# Patient Record
Sex: FEMALE | Race: WHITE | NOT HISPANIC OR LATINO
[De-identification: names, ages, dates, MRNs, and addresses within clinical notes are randomized per-mention and may not be internally consistent; named-entity substitution may affect disease eponyms.]

---

## 2023-04-20 PROBLEM — Z00.00 ENCOUNTER FOR PREVENTIVE HEALTH EXAMINATION: Status: ACTIVE | Noted: 2023-04-20

## 2023-04-21 ENCOUNTER — APPOINTMENT (OUTPATIENT)
Dept: ORTHOPEDIC SURGERY | Facility: CLINIC | Age: 78
End: 2023-04-21
Payer: MEDICARE

## 2023-04-21 DIAGNOSIS — Z87.898 PERSONAL HISTORY OF OTHER SPECIFIED CONDITIONS: ICD-10-CM

## 2023-04-21 DIAGNOSIS — Z90.2 ACQUIRED ABSENCE OF LUNG [PART OF]: ICD-10-CM

## 2023-04-21 PROCEDURE — 99213 OFFICE O/P EST LOW 20 MIN: CPT | Mod: 57

## 2023-04-21 PROCEDURE — 73140 X-RAY EXAM OF FINGER(S): CPT | Mod: LT

## 2023-04-21 PROCEDURE — 26740 TREAT FINGER FRACTURE EACH: CPT | Mod: LT

## 2023-04-21 PROCEDURE — L3908: CPT | Mod: RT

## 2023-04-21 PROCEDURE — 73110 X-RAY EXAM OF WRIST: CPT | Mod: RT

## 2023-04-21 RX ORDER — WARFARIN SODIUM 2.5 MG/1
2.5 TABLET ORAL
Refills: 0 | Status: ACTIVE | COMMUNITY

## 2023-04-21 RX ORDER — LORAZEPAM 1 MG/1
1 TABLET ORAL
Refills: 0 | Status: ACTIVE | COMMUNITY

## 2023-04-21 RX ORDER — TRIAMTERENE 50 MG/1
50 CAPSULE ORAL
Refills: 0 | Status: ACTIVE | COMMUNITY

## 2023-04-21 RX ORDER — NEBIVOLOL HYDROCHLORIDE 10 MG/1
10 TABLET ORAL
Refills: 0 | Status: ACTIVE | COMMUNITY

## 2023-04-21 RX ORDER — PAROXETINE HYDROCHLORIDE 10 MG/1
10 TABLET, FILM COATED ORAL
Refills: 0 | Status: ACTIVE | COMMUNITY

## 2023-04-21 NOTE — PHYSICAL EXAM
[de-identified] : R wrist\par Swelling\par Tender \par Limited ROM\par \par Xrays no obv fracture \par \par \par L RF \par Swelling PIP\par Tender\par Stiffness\par \par Xrays middle phalanx fx
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
## 2023-05-09 ENCOUNTER — APPOINTMENT (OUTPATIENT)
Dept: ORTHOPEDIC SURGERY | Facility: CLINIC | Age: 78
End: 2023-05-09
Payer: MEDICARE

## 2023-05-09 VITALS — BODY MASS INDEX: 25.44 KG/M2 | WEIGHT: 149 LBS | HEIGHT: 64 IN

## 2023-05-09 DIAGNOSIS — S63.501A UNSPECIFIED SPRAIN OF RIGHT WRIST, INITIAL ENCOUNTER: ICD-10-CM

## 2023-05-09 PROCEDURE — 99024 POSTOP FOLLOW-UP VISIT: CPT

## 2023-05-09 PROCEDURE — 73140 X-RAY EXAM OF FINGER(S): CPT | Mod: LT

## 2023-05-09 PROCEDURE — 73110 X-RAY EXAM OF WRIST: CPT | Mod: RT

## 2023-05-09 NOTE — HISTORY OF PRESENT ILLNESS
[6] : 6 [5] : 5 [de-identified] : R wrist is better\par \par L RF is still painful\par  [FreeTextEntry1] : R wrist, L RF [de-identified] : splint,brace

## 2023-05-09 NOTE — PHYSICAL EXAM
[de-identified] : R wrist\par Min swelling\par Nontender\par Improved ROM\par \par Xrays neg\par \par L RF \par Stiffness\par MIld swelling\par Mild tender\par \par Xrays healing, well aligned fx

## 2023-05-15 ENCOUNTER — NON-APPOINTMENT (OUTPATIENT)
Age: 78
End: 2023-05-15

## 2023-05-22 ENCOUNTER — APPOINTMENT (OUTPATIENT)
Dept: ORTHOPEDIC SURGERY | Facility: CLINIC | Age: 78
End: 2023-05-22
Payer: MEDICARE

## 2023-05-22 VITALS — HEIGHT: 64 IN | WEIGHT: 149 LBS | BODY MASS INDEX: 25.44 KG/M2

## 2023-05-22 PROCEDURE — 73140 X-RAY EXAM OF FINGER(S): CPT | Mod: LT

## 2023-05-22 PROCEDURE — 99024 POSTOP FOLLOW-UP VISIT: CPT

## 2023-05-22 NOTE — PHYSICAL EXAM
[de-identified] : L RF \par Mild swelling\par Mild tender\par Stiffness\par No defmority \par \par Xrays healed fx, well aligned

## 2023-05-22 NOTE — HISTORY OF PRESENT ILLNESS
[6] : 6 [4] : 4 [Dull/Aching] : dull/aching [de-identified] : L RF middle phalanx fracture 6 weeks\par She has pain  [FreeTextEntry1] : L YUDI, R wrist [de-identified] : splint

## 2023-06-13 ENCOUNTER — APPOINTMENT (OUTPATIENT)
Dept: ORTHOPEDIC SURGERY | Facility: CLINIC | Age: 78
End: 2023-06-13
Payer: MEDICARE

## 2023-06-13 VITALS — HEIGHT: 64 IN | WEIGHT: 149 LBS | BODY MASS INDEX: 25.44 KG/M2

## 2023-06-13 DIAGNOSIS — S62.655A NONDISPLACED FX OF MID PHALANX OF LT RING FINGER, INITIAL ENC. CLOSED FX: ICD-10-CM

## 2023-06-13 PROCEDURE — 99024 POSTOP FOLLOW-UP VISIT: CPT

## 2023-06-13 NOTE — HISTORY OF PRESENT ILLNESS
[4] : 4 [Dull/Aching] : dull/aching [FreeTextEntry1] : Left RF [de-identified] : L RF is doing well\par  [de-identified] : ziggy VILLANUEVA tape

## 2023-06-15 ENCOUNTER — NON-APPOINTMENT (OUTPATIENT)
Age: 78
End: 2023-06-15

## 2023-06-15 ENCOUNTER — APPOINTMENT (OUTPATIENT)
Dept: CARDIOLOGY | Facility: CLINIC | Age: 78
End: 2023-06-15
Payer: MEDICARE

## 2023-06-15 VITALS
HEIGHT: 64 IN | OXYGEN SATURATION: 96 % | SYSTOLIC BLOOD PRESSURE: 124 MMHG | BODY MASS INDEX: 28.17 KG/M2 | HEART RATE: 72 BPM | WEIGHT: 165 LBS | DIASTOLIC BLOOD PRESSURE: 78 MMHG

## 2023-06-15 DIAGNOSIS — R42 DIZZINESS AND GIDDINESS: ICD-10-CM

## 2023-06-15 DIAGNOSIS — R94.31 ABNORMAL ELECTROCARDIOGRAM [ECG] [EKG]: ICD-10-CM

## 2023-06-15 DIAGNOSIS — Z85.3 PERSONAL HISTORY OF MALIGNANT NEOPLASM OF BREAST: ICD-10-CM

## 2023-06-15 PROCEDURE — 93000 ELECTROCARDIOGRAM COMPLETE: CPT

## 2023-06-15 PROCEDURE — 99204 OFFICE O/P NEW MOD 45 MIN: CPT

## 2023-06-15 RX ORDER — COLESEVELAM HYDROCHLORIDE 625 MG/1
625 TABLET, COATED ORAL
Qty: 120 | Refills: 0 | Status: ACTIVE | COMMUNITY
Start: 2023-03-06

## 2023-06-21 ENCOUNTER — APPOINTMENT (OUTPATIENT)
Dept: CARDIOLOGY | Facility: CLINIC | Age: 78
End: 2023-06-21
Payer: MEDICARE

## 2023-06-21 PROCEDURE — 93306 TTE W/DOPPLER COMPLETE: CPT

## 2023-06-21 PROCEDURE — 93246 EXT ECG>7D<15D RECORDING: CPT

## 2023-06-26 PROBLEM — R42 DIZZINESS: Status: ACTIVE | Noted: 2023-06-15

## 2023-06-26 PROBLEM — R94.31 ABNORMAL ECG: Status: ACTIVE | Noted: 2023-06-15

## 2023-06-26 NOTE — DISCUSSION/SUMMARY
[___ Week(s)] : in [unfilled] week(s) [FreeTextEntry3] : Echo, 7-day Zio patch [FreeTextEntry1] : I have ordered an echocardiogram to assess LV function and valvular status.  7-day Zio patch extended Holter monitor ordered to rule out dysrhythmias especially with an abnormal ECG.  I recommended a heart healthy lifestyle including a low-saturated fat, low cholesterol diet with improved aerobic physical fitness over time for cardiovascular benefits.  Continued lipid-lowering strategy with colesevelam and antihypertensive regimen to continue.  We will call the patient with test results and followup with you for general care.

## 2023-06-26 NOTE — HISTORY OF PRESENT ILLNESS
[FreeTextEntry1] : 77 years of age and comes to the office for cardiac assessment with history of prior pituitary surgery, breast cancer, lung surgery, hypertension, BMI of 28, complains of dizziness.  No current chest symptoms ordered.  He is generally healthy and takes her medications as prescribed.

## 2023-06-26 NOTE — CARDIOLOGY SUMMARY
[de-identified] : Sinus  Rhythm  -With rate variation  cv = 14. Low voltage in precordial leads.   -Right atrial enlargement.   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   ABNORMAL

## 2023-08-08 ENCOUNTER — NON-APPOINTMENT (OUTPATIENT)
Age: 78
End: 2023-08-08

## 2023-08-16 ENCOUNTER — APPOINTMENT (OUTPATIENT)
Dept: OTOLARYNGOLOGY | Facility: CLINIC | Age: 78
End: 2023-08-16

## 2024-09-10 ENCOUNTER — APPOINTMENT (OUTPATIENT)
Dept: ORTHOPEDIC SURGERY | Facility: CLINIC | Age: 79
End: 2024-09-10

## 2025-06-27 ENCOUNTER — APPOINTMENT (OUTPATIENT)
Dept: ORTHOPEDIC SURGERY | Facility: CLINIC | Age: 80
End: 2025-06-27
Payer: MEDICARE

## 2025-06-27 PROBLEM — M17.11 ARTHRITIS OF KNEE, RIGHT: Status: ACTIVE | Noted: 2025-06-27

## 2025-06-27 PROBLEM — M25.361 OTHER INSTABILITY, RIGHT KNEE: Status: ACTIVE | Noted: 2025-06-27

## 2025-06-27 PROCEDURE — 73562 X-RAY EXAM OF KNEE 3: CPT | Mod: RT

## 2025-06-27 PROCEDURE — 99214 OFFICE O/P EST MOD 30 MIN: CPT

## 2025-06-27 PROCEDURE — L1820: CPT | Mod: RT,KX

## 2025-06-27 RX ORDER — TRAMADOL HYDROCHLORIDE AND ACETAMINOPHEN 37.5; 325 MG/1; MG/1
37.5-325 TABLET, FILM COATED ORAL TWICE DAILY
Qty: 14 | Refills: 0 | Status: ACTIVE | COMMUNITY
Start: 2025-06-27 | End: 1900-01-01

## 2025-07-10 ENCOUNTER — NON-APPOINTMENT (OUTPATIENT)
Age: 80
End: 2025-07-10

## 2025-07-10 ENCOUNTER — APPOINTMENT (OUTPATIENT)
Dept: ORTHOPEDIC SURGERY | Facility: CLINIC | Age: 80
End: 2025-07-10
Payer: MEDICARE

## 2025-07-10 VITALS — BODY MASS INDEX: 28.17 KG/M2 | HEIGHT: 64 IN | WEIGHT: 165 LBS

## 2025-07-10 PROBLEM — M25.572 ACUTE LEFT ANKLE PAIN: Status: ACTIVE | Noted: 2025-07-10

## 2025-07-10 PROBLEM — S92.155A CLOSED NONDISPLACED AVULSION FRACTURE OF LEFT TALUS, INITIAL ENCOUNTER: Status: ACTIVE | Noted: 2025-07-10

## 2025-07-10 PROCEDURE — 73630 X-RAY EXAM OF FOOT: CPT | Mod: LT

## 2025-07-10 PROCEDURE — 73610 X-RAY EXAM OF ANKLE: CPT | Mod: LT

## 2025-07-10 PROCEDURE — 99203 OFFICE O/P NEW LOW 30 MIN: CPT

## 2025-07-24 ENCOUNTER — APPOINTMENT (OUTPATIENT)
Dept: ORTHOPEDIC SURGERY | Facility: CLINIC | Age: 80
End: 2025-07-24